# Patient Record
Sex: MALE | Race: BLACK OR AFRICAN AMERICAN | Employment: FULL TIME | URBAN - METROPOLITAN AREA
[De-identification: names, ages, dates, MRNs, and addresses within clinical notes are randomized per-mention and may not be internally consistent; named-entity substitution may affect disease eponyms.]

---

## 2018-08-03 ENCOUNTER — OFFICE VISIT (OUTPATIENT)
Dept: FAMILY MEDICINE CLINIC | Age: 48
End: 2018-08-03

## 2018-08-03 VITALS
WEIGHT: 215.2 LBS | DIASTOLIC BLOOD PRESSURE: 88 MMHG | TEMPERATURE: 98 F | HEIGHT: 74 IN | BODY MASS INDEX: 27.62 KG/M2 | HEART RATE: 82 BPM | RESPIRATION RATE: 20 BRPM | OXYGEN SATURATION: 98 % | SYSTOLIC BLOOD PRESSURE: 138 MMHG

## 2018-08-03 DIAGNOSIS — R22.9 SUBCUTANEOUS MASS: ICD-10-CM

## 2018-08-03 DIAGNOSIS — Z00.00 ANNUAL PHYSICAL EXAM: Primary | ICD-10-CM

## 2018-08-03 DIAGNOSIS — Z12.11 SCREENING FOR COLON CANCER: ICD-10-CM

## 2018-08-03 DIAGNOSIS — E78.2 MIXED HYPERLIPIDEMIA: ICD-10-CM

## 2018-08-03 DIAGNOSIS — R19.01 ABDOMINAL WALL MASS OF RIGHT UPPER QUADRANT: ICD-10-CM

## 2018-08-03 LAB
BILIRUB UR QL STRIP: NEGATIVE
GLUCOSE UR-MCNC: NEGATIVE MG/DL
KETONES P FAST UR STRIP-MCNC: NEGATIVE MG/DL
PH UR STRIP: 6 [PH] (ref 4.6–8)
PROT UR QL STRIP: NEGATIVE
SP GR UR STRIP: 1.02 (ref 1–1.03)
UA UROBILINOGEN AMB POC: NORMAL (ref 0.2–1)
URINALYSIS CLARITY POC: CLEAR
URINALYSIS COLOR POC: YELLOW
URINE BLOOD POC: NEGATIVE
URINE LEUKOCYTES POC: NEGATIVE
URINE NITRITES POC: NEGATIVE

## 2018-08-03 RX ORDER — DICLOFENAC SODIUM 75 MG/1
75 TABLET, DELAYED RELEASE ORAL
COMMUNITY
Start: 2017-07-07 | End: 2018-08-03

## 2018-08-03 NOTE — MR AVS SNAPSHOT
[facility] 
 
 
 [address] [facility] [id number] [phone] Patient: Greta Lyons MRN: [id number] :1970 Visit Information Date & Time Provider Department Dept. Phone Encounter #  
 8/3/2018 10:15 AM Brien Smith [phone] [id number] Follow-up Instructions Return in about 1 year (around 8/3/2019). Upcoming Health Maintenance Date Due Pneumococcal 19-64 Medium Risk (1 of 1 - PPSV23) 1987 FOBT Q 1 YEAR AGE 50-75 2018 Influenza Age 5 to Adult 2018 DTaP/Tdap/Td series (2 - Td) 2026 Allergies as of 8/3/2018  Review Complete On: 8/3/2018 By: eHide Mccollum No Known Allergies Current Immunizations  Never Reviewed No immunizations on file. Not reviewed this visit You Were Diagnosed With   
  
 Codes Comments Annual physical exam    -  Primary ICD-10-CM: Z00.00 ICD-9-CM: V70.0 Mixed hyperlipidemia     ICD-10-CM: E78.2 ICD-9-CM: 272.2 Subcutaneous mass     ICD-10-CM: R22.9 ICD-9-CM: 782.2 Screening for colon cancer     ICD-10-CM: Z12.11 ICD-9-CM: V76.51 Vitals BP Pulse Temp Resp Height(growth percentile) Weight(growth percentile) 138/88 (BP 1 Location: Left arm, BP Patient Position: Sitting) 82 98 °F (36.7 °C) (Oral) 20 6' 2\" (1.88 m) 215 lb 3.2 oz (97.6 kg) SpO2 BMI Smoking Status 98% 27.63 kg/m2 Current Every Day Smoker Vitals History BMI and BSA Data Body Mass Index Body Surface Area  
 27.63 kg/m 2 2.26 m 2 Preferred Pharmacy Pharmacy Name Phone [facility] [address] [address] [phone] Your Updated Medication List  
  
Notice  As of 8/3/2018 10:57 AM  
 You have not been prescribed any medications. We Performed the Following AMB POC URINALYSIS DIP STICK AUTO W/O MICRO [11040 CPT(R)] CBC WITH AUTOMATED DIFF [12049 CPT(R)] LIPID PANEL [61911 CPT(R)] METABOLIC PANEL, COMPREHENSIVE [95432 CPT(R)] PSA, DIAGNOSTIC (PROSTATE SPECIFIC AG) [id number] CPT(R)] REFERRAL TO GASTROENTEROLOGY [DZQ37 Custom] Follow-up Instructions Return in about 1 year (around 8/3/2019). Referral Information Referral ID Referred By Referred To  
  
 [id number] Jacklyn Jama MD   
   [facility] [employer] [address] [facility] [address], [address] Phone: [phone] Fax: [phone] Visits Status Start Date End Date 1 New Request 8/3/18 8/3/19 If your referral has a status of pending review or denied, additional information will be sent to support the outcome of this decision. Introducing [facility]! [facility] introduces WizIQ patient portal. Now you can access parts of your medical record, email your doctor's office, and request medication refills online. 1. In your internet browser, go to [email or web address] 2. Click on the First Time User? Click Here link in the Sign In box. You will see the New Member Sign Up page. 3. Enter your WizIQ Access Code exactly as it appears below. You will not need to use this code after youve completed the sign-up process. If you do not sign up before the expiration date, you must request a new code. · WizIQ Access Code: [email or web address] Expires: 11/1/2018 10:57 AM 
 
4. Enter the last four digits of your Social Security Number (xxxx) and Date of Birth (mm/dd/yyyy) as indicated and click Submit. You will be taken to the next sign-up page. 5. Create a NanoTunet ID. This will be your NanoTunet login ID and cannot be changed, so think of one that is secure and easy to remember. 6. Create a NanoTunet password. You can change your password at any time. 7. Enter your Password Reset Question and Answer. This can be used at a later time if you forget your password. 8. Enter your e-mail address. You will receive e-mail notification when new information is available in [address] 9. Click Sign Up. You can now view and download portions of your medical record. 10. Click the Download Summary menu link to download a portable copy of your medical information. If you have questions, please visit the Frequently Asked Questions section of the fos4X website. Remember, fos4X is NOT to be used for urgent needs. For medical emergencies, dial 911. Now available from your iPhone and Android! Please provide this summary of care documentation to your next provider. Your primary care clinician is listed as Tana Mims. If you have any questions after today's visit, please call [phone].

## 2018-08-03 NOTE — PROGRESS NOTES
Subjective:  
 
Robert Adam is a 46 y.o. male presenting for annual exam and complete physical. 
 
Patient Active Problem List  
Diagnosis Code  OA (osteoarthritis) M19.90 Patient Active Problem List  
 Diagnosis Date Noted  OA (osteoarthritis) 06/15/2010 No Known Allergies Past Medical History:  
Diagnosis Date  OA (osteoarthritis) 6/15/2010 Past Surgical History:  
Procedure Laterality Date  HX ORCHIECTOMY TORSION  
 KNEE SCOPE,AID ANT CRUCIATE REPAIR Family History Problem Relation Age of Onset  Elevated Lipids Mother  Heart Disease Father Social History Substance Use Topics  Smoking status: Current Every Day Smoker Packs/day: 0.50  Smokeless tobacco: Never Used  Alcohol use Yes Comment: socially Review of Systems Constitutional: negative Eyes: negative Ears, nose, mouth, throat, and face: negative Respiratory: negative Cardiovascular: negative Gastrointestinal: negative Genitourinary:negative Integument/breast: negative Musculoskeletal:negative Objective:  
 
Visit Vitals  /88 (BP 1 Location: Left arm, BP Patient Position: Sitting)  Pulse 82  Temp 98 °F (36.7 °C) (Oral)  Resp 20  
 Ht 6' 2\" (1.88 m)  Wt 215 lb 3.2 oz (97.6 kg)  SpO2 98%  BMI 27.63 kg/m2 Physical exam:  
General appearance - alert, well appearing, and in no distress Mental status - alert, oriented to person, place, and time Eyes - pupils equal and reactive, extraocular eye movements intact Ears - bilateral TM's and external ear canals normal 
Nose - normal and patent, no erythema, discharge or polyps Mouth - mucous membranes moist, pharynx normal without lesions Neck - supple, no significant adenopathy, carotids upstroke normal bilaterally, no bruits, thyroid exam: thyroid is normal in size without nodules or tenderness Chest - clear to auscultation, no wheezes, rales or rhonchi, symmetric air entry Heart - normal rate, regular rhythm, normal S1, S2, no murmurs, rubs, clicks or gallops Abdomen - soft, nontender, nondistended, no masses or organomegaly 2x2 cm soft RUQ  Subcutaneous mass Rectal - negative without mass, lesions or tenderness Musculoskeletal - no joint tenderness, deformity or swelling Extremities - peripheral pulses normal, no pedal edema, no clubbing or cyanosis Skin - normal coloration and turgor, no rashes, no suspicious skin lesions noted Diagnoses and all orders for this visit: 
 
1. Annual physical exam 
-     METABOLIC PANEL, COMPREHENSIVE 
-     CBC WITH AUTOMATED DIFF 
-     PROSTATE SPECIFIC AG 
-     AMB POC URINALYSIS DIP STICK AUTO W/O MICRO 2. Mixed hyperlipidemia -     LIPID PANEL 3. Subcutaneous mass 4. Screening for colon cancer 
-     Cesia Rags[facility] 5. Abdominal wall mass of right upper quadrant Follow-up Disposition: 
Return in about 1 year (around 8/3/2019). Eliseo Callahan

## 2018-08-04 LAB
ALBUMIN SERPL-MCNC: 4.4 G/DL (ref 3.5–5.5)
ALBUMIN/GLOB SERPL: 1.5 {RATIO} (ref 1.2–2.2)
ALP SERPL-CCNC: 62 IU/L (ref 39–117)
ALT SERPL-CCNC: 55 IU/L (ref 0–44)
AST SERPL-CCNC: 33 IU/L (ref 0–40)
BASOPHILS # BLD AUTO: 0.1 X10E3/UL (ref 0–0.2)
BASOPHILS NFR BLD AUTO: 1 %
BILIRUB SERPL-MCNC: 0.5 MG/DL (ref 0–1.2)
BUN SERPL-MCNC: 14 MG/DL (ref 6–24)
BUN/CREAT SERPL: 14 (ref 9–20)
CALCIUM SERPL-MCNC: 9.2 MG/DL (ref 8.7–10.2)
CHLORIDE SERPL-SCNC: 107 MMOL/L (ref 96–106)
CHOLEST SERPL-MCNC: 235 MG/DL (ref 100–199)
CO2 SERPL-SCNC: 19 MMOL/L (ref 20–29)
CREAT SERPL-MCNC: 0.97 MG/DL (ref 0.76–1.27)
EOSINOPHIL # BLD AUTO: 0.2 X10E3/UL (ref 0–0.4)
EOSINOPHIL NFR BLD AUTO: 4 %
ERYTHROCYTE [DISTWIDTH] IN BLOOD BY AUTOMATED COUNT: 14.1 % (ref 12.3–15.4)
GLOBULIN SER CALC-MCNC: 3 G/DL (ref 1.5–4.5)
GLUCOSE SERPL-MCNC: 95 MG/DL (ref 65–99)
HCT VFR BLD AUTO: 43 % (ref 37.5–51)
HDLC SERPL-MCNC: 42 MG/DL
HGB BLD-MCNC: 14.4 G/DL (ref 13–17.7)
IMM GRANULOCYTES # BLD: 0 X10E3/UL (ref 0–0.1)
IMM GRANULOCYTES NFR BLD: 1 %
INTERPRETATION, 910389: NORMAL
LDLC SERPL CALC-MCNC: 169 MG/DL (ref 0–99)
LYMPHOCYTES # BLD AUTO: 2.4 X10E3/UL (ref 0.7–3.1)
LYMPHOCYTES NFR BLD AUTO: 41 %
MCH RBC QN AUTO: 28.8 PG (ref 26.6–33)
MCHC RBC AUTO-ENTMCNC: 33.5 G/DL (ref 31.5–35.7)
MCV RBC AUTO: 86 FL (ref 79–97)
MONOCYTES # BLD AUTO: 0.6 X10E3/UL (ref 0.1–0.9)
MONOCYTES NFR BLD AUTO: 11 %
NEUTROPHILS # BLD AUTO: 2.3 X10E3/UL (ref 1.4–7)
NEUTROPHILS NFR BLD AUTO: 42 %
PLATELET # BLD AUTO: 269 X10E3/UL (ref 150–379)
POTASSIUM SERPL-SCNC: 4.5 MMOL/L (ref 3.5–5.2)
PROT SERPL-MCNC: 7.4 G/DL (ref 6–8.5)
PSA SERPL-MCNC: 0.5 NG/ML (ref 0–4)
RBC # BLD AUTO: 5 X10E6/UL (ref 4.14–5.8)
SODIUM SERPL-SCNC: 141 MMOL/L (ref 134–144)
TRIGL SERPL-MCNC: 121 MG/DL (ref 0–149)
VLDLC SERPL CALC-MCNC: 24 MG/DL (ref 5–40)
WBC # BLD AUTO: 5.5 X10E3/UL (ref 3.4–10.8)

## 2022-01-10 PROBLEM — Z00.00 ANNUAL PHYSICAL EXAM: Status: ACTIVE | Noted: 2022-01-10

## 2022-01-11 ENCOUNTER — OFFICE VISIT (OUTPATIENT)
Dept: FAMILY MEDICINE CLINIC | Age: 52
End: 2022-01-11
Payer: COMMERCIAL

## 2022-01-11 VITALS
OXYGEN SATURATION: 98 % | RESPIRATION RATE: 20 BRPM | HEART RATE: 88 BPM | HEIGHT: 74 IN | SYSTOLIC BLOOD PRESSURE: 122 MMHG | TEMPERATURE: 98.4 F | WEIGHT: 220.4 LBS | BODY MASS INDEX: 28.28 KG/M2 | DIASTOLIC BLOOD PRESSURE: 78 MMHG

## 2022-01-11 DIAGNOSIS — Z12.11 SCREEN FOR COLON CANCER: ICD-10-CM

## 2022-01-11 DIAGNOSIS — R35.1 NOCTURIA: ICD-10-CM

## 2022-01-11 DIAGNOSIS — Z11.59 ENCOUNTER FOR HEPATITIS C SCREENING TEST FOR LOW RISK PATIENT: ICD-10-CM

## 2022-01-11 DIAGNOSIS — E78.2 MIXED HYPERLIPIDEMIA: ICD-10-CM

## 2022-01-11 DIAGNOSIS — Z00.00 ANNUAL PHYSICAL EXAM: Primary | ICD-10-CM

## 2022-01-11 PROCEDURE — 99386 PREV VISIT NEW AGE 40-64: CPT | Performed by: FAMILY MEDICINE

## 2022-01-11 NOTE — PROGRESS NOTES
Subjective:     Pete Gan is a 46 y.o. male presenting for annual exam and complete physical.On no meds. Feeling well    Patient Active Problem List    Diagnosis Date Noted    Annual physical exam 01/10/2022    OA (osteoarthritis) 06/15/2010       No Known Allergies  Past Medical History:   Diagnosis Date    OA (osteoarthritis) 6/15/2010     Past Surgical History:   Procedure Laterality Date    HX ORCHIECTOMY      TORSION    AR KNEE SCOPE,AID ANT CRUCIATE REPAIR       Family History   Problem Relation Age of Onset    Elevated Lipids Mother     Heart Disease Father      Social History     Tobacco Use    Smoking status: Current Every Day Smoker     Packs/day: 0.50    Smokeless tobacco: Never Used   Substance Use Topics    Alcohol use: Yes     Comment: socially             Review of Systems  Constitutional: negative  Eyes: negative  Ears, nose, mouth, throat, and face: positive for nasal congestion  Respiratory: negative  Cardiovascular: negative  Gastrointestinal: negative  Genitourinary:negative  Integument/breast: negative  Hematologic/lymphatic: negative  Musculoskeletal:positive for bilateral knee pain/oa  Neurological: negative    Objective:     Visit Vitals  /78 (BP 1 Location: Right upper arm, BP Patient Position: Sitting, BP Cuff Size: Adult)   Pulse 88   Temp 98.4 °F (36.9 °C) (Oral)   Resp 20   Ht 6' 2\" (1.88 m)   Wt 220 lb 6.4 oz (100 kg)   SpO2 98%   BMI 28.30 kg/m²     Physical exam:   General appearance - alert, well appearing, and in no distress  Mental status - alert, oriented to person, place, and time  Eyes - pupils equal and reactive, extraocular eye movements intact  Ears - bilateral TM's and external ear canals normal  Nose - normal and patent, no erythema, discharge or polyps  Mouth - mucous membranes moist, pharynx normal without lesions  Neck - supple, no significant adenopathy, carotids upstroke normal bilaterally, no bruits, thyroid exam: thyroid is normal in size without nodules or tenderness  Chest - clear to auscultation, no wheezes, rales or rhonchi, symmetric air entry  Heart - normal rate, regular rhythm, normal S1, S2, no murmurs, rubs, clicks or gallops  Abdomen - soft, nontender, nondistended, no masses or organomegaly  Rectal - negative without mass, lesions or tenderness, stool guaiac negative, PROSTATE EXAM: smooth and symmetric without nodules or tenderness  Neurological - alert, oriented, normal speech, no focal findings or movement disorder noted  Musculoskeletal - no joint tenderness, deformity or swelling  Extremities - peripheral pulses normal, no pedal edema, no clubbing or cyanosis     Assessment/Plan:       continue present plan, routine labs ordered, call if any problems. Diagnoses and all orders for this visit:    1. Annual physical exam  -     METABOLIC PANEL, COMPREHENSIVE; Future  -     LIPID PANEL; Future  -     CBC WITH AUTOMATED DIFF; Future  -     PSA, DIAGNOSTIC (PROSTATE SPECIFIC AG); Future  -     URINALYSIS W/ RFLX MICROSCOPIC; Future    2. Nocturia    3. Screen for colon cancer    4. Mixed hyperlipidemia    5. Encounter for hepatitis C screening test for low risk patient  -     HEPATITIS C AB; Future        Doing well,continue current meds and treatments    Follow-up and Dispositions    · Return in about 1 year (around 1/11/2023).      Tigist Reyez

## 2022-01-13 LAB
ALBUMIN SERPL-MCNC: 4 G/DL (ref 3.5–5)
ALBUMIN/GLOB SERPL: 1 {RATIO} (ref 1.1–2.2)
ALP SERPL-CCNC: 70 U/L (ref 45–117)
ALT SERPL-CCNC: 79 U/L (ref 12–78)
ANION GAP SERPL CALC-SCNC: 7 MMOL/L (ref 5–15)
APPEARANCE UR: CLEAR
AST SERPL-CCNC: 32 U/L (ref 15–37)
BASOPHILS # BLD: 0.1 K/UL (ref 0–0.1)
BASOPHILS NFR BLD: 1 % (ref 0–1)
BILIRUB SERPL-MCNC: 0.3 MG/DL (ref 0.2–1)
BILIRUB UR QL: NEGATIVE
BUN SERPL-MCNC: 24 MG/DL (ref 6–20)
BUN/CREAT SERPL: 21 (ref 12–20)
CALCIUM SERPL-MCNC: 9.7 MG/DL (ref 8.5–10.1)
CHLORIDE SERPL-SCNC: 107 MMOL/L (ref 97–108)
CHOLEST SERPL-MCNC: 270 MG/DL
CO2 SERPL-SCNC: 23 MMOL/L (ref 21–32)
COLOR UR: NORMAL
CREAT SERPL-MCNC: 1.13 MG/DL (ref 0.7–1.3)
DIFFERENTIAL METHOD BLD: NORMAL
EOSINOPHIL # BLD: 0.4 K/UL (ref 0–0.4)
EOSINOPHIL NFR BLD: 5 % (ref 0–7)
ERYTHROCYTE [DISTWIDTH] IN BLOOD BY AUTOMATED COUNT: 13.4 % (ref 11.5–14.5)
GLOBULIN SER CALC-MCNC: 4 G/DL (ref 2–4)
GLUCOSE SERPL-MCNC: 107 MG/DL (ref 65–100)
GLUCOSE UR STRIP.AUTO-MCNC: NEGATIVE MG/DL
HCT VFR BLD AUTO: 47.4 % (ref 36.6–50.3)
HCV AB SERPL QL IA: NONREACTIVE
HDLC SERPL-MCNC: 39 MG/DL
HDLC SERPL: 6.9 {RATIO} (ref 0–5)
HGB BLD-MCNC: 15.9 G/DL (ref 12.1–17)
HGB UR QL STRIP: NEGATIVE
IMM GRANULOCYTES # BLD AUTO: 0 K/UL (ref 0–0.04)
IMM GRANULOCYTES NFR BLD AUTO: 0 % (ref 0–0.5)
KETONES UR QL STRIP.AUTO: NEGATIVE MG/DL
LDLC SERPL CALC-MCNC: 185.6 MG/DL (ref 0–100)
LEUKOCYTE ESTERASE UR QL STRIP.AUTO: NEGATIVE
LYMPHOCYTES # BLD: 2.8 K/UL (ref 0.8–3.5)
LYMPHOCYTES NFR BLD: 40 % (ref 12–49)
MCH RBC QN AUTO: 29.6 PG (ref 26–34)
MCHC RBC AUTO-ENTMCNC: 33.5 G/DL (ref 30–36.5)
MCV RBC AUTO: 88.1 FL (ref 80–99)
MONOCYTES # BLD: 0.9 K/UL (ref 0–1)
MONOCYTES NFR BLD: 13 % (ref 5–13)
NEUTS SEG # BLD: 2.8 K/UL (ref 1.8–8)
NEUTS SEG NFR BLD: 41 % (ref 32–75)
NITRITE UR QL STRIP.AUTO: NEGATIVE
NRBC # BLD: 0 K/UL (ref 0–0.01)
NRBC BLD-RTO: 0 PER 100 WBC
PH UR STRIP: 5.5 [PH] (ref 5–8)
PLATELET # BLD AUTO: 301 K/UL (ref 150–400)
PLATELET COMMENTS,PCOM: NORMAL
POTASSIUM SERPL-SCNC: 4.5 MMOL/L (ref 3.5–5.1)
PROT SERPL-MCNC: 8 G/DL (ref 6.4–8.2)
PROT UR STRIP-MCNC: NEGATIVE MG/DL
PSA SERPL-MCNC: 0.6 NG/ML (ref 0.01–4)
RBC # BLD AUTO: 5.38 M/UL (ref 4.1–5.7)
RBC MORPH BLD: NORMAL
SODIUM SERPL-SCNC: 137 MMOL/L (ref 136–145)
SP GR UR REFRACTOMETRY: 1.02 (ref 1–1.03)
TRIGL SERPL-MCNC: 227 MG/DL (ref ?–150)
UROBILINOGEN UR QL STRIP.AUTO: 0.2 EU/DL (ref 0.2–1)
VLDLC SERPL CALC-MCNC: 45.4 MG/DL
WBC # BLD AUTO: 7 K/UL (ref 4.1–11.1)

## 2022-02-09 PROBLEM — Z00.00 ANNUAL PHYSICAL EXAM: Status: RESOLVED | Noted: 2022-01-10 | Resolved: 2022-02-09

## 2022-03-15 NOTE — PROGRESS NOTES
HPI: Medina Springer (: 1970) is a 46 y.o. male, patient, here for evaluation of the following chief complaint(s):    Wrist Pain (Fall 22 to left wrist. Xrays at [facility]. Left hand dominant.)  The patient is seen today to evaluate his left wrist.  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 He actually fell got up and then fell a second time. He thought he sprained his wrist and waited a couple days but his pain did not improve. [facility] X-rays confirmed a minimally displaced ulnar styloid base fracture. He was immobilized into a splint. There has been no additional report of any other falls or injuries. He has denied numbness or tingling in his hand and is seen for further treatment. Vitals:  Ht 6' 1\" (1.854 m)   Wt 219 lb (99.3 kg)   BMI 28.89 kg/m²    Body mass index is 28.89 kg/m². No Known Allergies    Current Outpatient Medications   Medication Sig    ibuprofen (AdviL) 200 mg tablet Take  by mouth. No current facility-administered medications for this visit. Past Medical History:   Diagnosis Date    OA (osteoarthritis) 6/15/2010        Past Surgical History:   Procedure Laterality Date    HX ORCHIECTOMY      TORSION    DE KNEE SCOPE,AID ANT CRUCIATE REPAIR         Family History   Problem Relation Age of Onset    Elevated Lipids Mother     Heart Disease Father         Social History     Tobacco Use    Smoking status: Current Every Day Smoker     Packs/day: 0.50    Smokeless tobacco: Never Used   Substance Use Topics    Alcohol use: Yes     Comment: socially    Drug use: No        Review of Systems   All other systems reviewed and are negative. ROS     Positive for: Musculoskeletal    Last edited by Gene Vega on 3/16/2022  8:05 AM. (History)             Physical Exam    The patient's left wrist is tender to the ulnar aspect.   He really did not have significant tenderness radially. He has guarded overall motion. Good digital range of motion sensation refill. No complaints with right elbow, forearm, wrist and hand. Imaging:    XR Results (most recent):  Results from Appointment encounter on 03/16/22    XR WRIST LT AP/LAT/OBL MIN 3V    Narrative  AP, lateral and oblique x-ray of the left wrist shows a displaced ulnar styloid base fracture. No additional displacement of any other fracture noted. Outside x-rays from February 26 had also been reviewed and showed less displacement and angulation of the ulnar styloid base fracture involving the left wrist no distal radius fracture or other fracture pattern was definitively seen. ASSESSMENT/PLAN:  Below is the assessment and plan developed based on review of pertinent history, physical exam, labs, studies, and medications. Patient fell while working injuring his dominant left wrist on 2/24/2022 and sustained an ulnar styloid fracture. The fracture fragment is a little more displaced than initially. This is his dominant left wrist and hand. He is concerned regarding the future of the healing. We spoke about conservative as well as operative options. He is 3 weeks out at presentation with a displaced isolated ulnar styloid fracture to his dominant left wrist and would like to have this surgically repaired. I drew pictures of the cannulated screw fixation of the ulnar styloid fracture and answered questions. I reviewed risks that include but not limited to stiffness, pain, nerve or tendon damage, incomplete healing and possible future surgery. Arrangements can be made for this to be performed on an outpatient basis soon as possible. 1. Left wrist pain  2. Traumatic closed fracture of ulnar styloid with minimal displacement, left, initial encounter      Return in about 4 weeks (around 4/13/2022). An electronic signature was used to authenticate this note.   -- Caprice Leyden, MD

## 2022-03-16 ENCOUNTER — OFFICE VISIT (OUTPATIENT)
Dept: ORTHOPEDIC SURGERY | Age: 52
End: 2022-03-16
Payer: OTHER MISCELLANEOUS

## 2022-03-16 VITALS — WEIGHT: 219 LBS | HEIGHT: 73 IN | BODY MASS INDEX: 29.03 KG/M2

## 2022-03-16 DIAGNOSIS — S52.612A TRAUMATIC CLOSED FRACTURE OF ULNAR STYLOID WITH MINIMAL DISPLACEMENT, LEFT, INITIAL ENCOUNTER: Primary | ICD-10-CM

## 2022-03-16 DIAGNOSIS — M25.532 LEFT WRIST PAIN: ICD-10-CM

## 2022-03-16 PROCEDURE — 99203 OFFICE O/P NEW LOW 30 MIN: CPT | Performed by: ORTHOPAEDIC SURGERY

## 2022-03-16 RX ORDER — IBUPROFEN 200 MG
TABLET ORAL
COMMUNITY

## 2022-03-16 NOTE — LETTER
3/16/2022    Patient: Vernon Irvin   YOB: 1970   Date of Visit: 3/16/2022     Ragini Varghese, [address] [address]  [facility] [address]    Dear Ragini Varghese MD,      Thank you for referring Mr. Yumiko Castro to [facility] for evaluation. My notes for this consultation are attached. If you have questions, please do not hesitate to call me. I look forward to following your patient along with you.       Sincerely,    Doreen English MD

## 2022-03-21 DIAGNOSIS — S52.612A TRAUMATIC CLOSED FRACTURE OF ULNAR STYLOID WITH MINIMAL DISPLACEMENT, LEFT, INITIAL ENCOUNTER: Primary | ICD-10-CM

## 2022-03-21 RX ORDER — OXYCODONE AND ACETAMINOPHEN 5; 325 MG/1; MG/1
1 TABLET ORAL
Qty: 15 TABLET | Refills: 0 | Status: SHIPPED | OUTPATIENT
Start: 2022-03-21 | End: 2022-04-04

## 2022-03-25 NOTE — PROGRESS NOTES
HPI: Yassine Perez (: 1970) is a 46 y.o. male, patient, here for evaluation of the following chief complaint(s):    No chief complaint on file. The patient is seen today to evaluate his left wrist.  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 He actually fell got up and then fell a second time. He thought he sprained his wrist and waited a couple days but his pain did not improve. [facility]. X-rays confirmed a minimally displaced ulnar styloid base fracture. He was immobilized into a splint. There has been no additional report of any other falls or injuries. He has denied numbness or tingling in his hand. He underwent ORIF of the left ulnar styloid fracture on 3/21/2022. Vitals: There were no vitals taken for this visit. There is no height or weight on file to calculate BMI. No Known Allergies    Current Outpatient Medications   Medication Sig    oxyCODONE-acetaminophen (Percocet) 5-325 mg per tablet Take 1 Tablet by mouth every four (4) hours as needed for Pain for up to 14 days. Max Daily Amount: 6 Tablets.  ibuprofen (AdviL) 200 mg tablet Take  by mouth. No current facility-administered medications for this visit. Past Medical History:   Diagnosis Date    OA (osteoarthritis) 6/15/2010        Past Surgical History:   Procedure Laterality Date    HX ORCHIECTOMY      TORSION    MT KNEE SCOPE,AID ANT CRUCIATE REPAIR         Family History   Problem Relation Age of Onset    Elevated Lipids Mother     Heart Disease Father         Social History     Tobacco Use    Smoking status: Current Every Day Smoker     Packs/day: 0.50    Smokeless tobacco: Never Used   Substance Use Topics    Alcohol use: Yes     Comment: socially    Drug use: No        Review of Systems   All other systems reviewed and are negative.              Physical Exam    The patient's left wrist wound is healing well with absorbing sutures in place. No redness drainage or sign of infection. Good early motion. Imaging:    XR Results (most recent):  Results from Appointment encounter on 03/16/22    XR WRIST LT AP/LAT/OBL MIN 3V    Narrative  AP, lateral and oblique x-ray of the left wrist shows a displaced ulnar styloid base fracture. No additional displacement of any other fracture noted. Outside x-rays from February 26 had also been reviewed and showed less displacement and angulation of the ulnar styloid base fracture involving the left wrist no distal radius fracture or other fracture pattern was definitively seen. XR Results (most recent):  Results from Appointment encounter on 03/29/22    XR WRIST LT AP/LAT/OBL MIN 3V    Narrative  AP, lateral and oblique x-ray of the left wrist shows the single screw fixation of the ulnar styloid fracture thus far in satisfactory position and alignment. ASSESSMENT/PLAN:  Below is the assessment and plan developed based on review of pertinent history, physical exam, labs, studies, and medications. Patient fell while working injuring his dominant left wrist on 2/24/2022 and sustained an ulnar styloid fracture. The fracture fragment is a little more displaced than initially. This is his dominant left wrist and hand. He is concerned regarding the future of the healing. We spoke about conservative as well as operative options. He is 3 weeks out at presentation with a displaced isolated ulnar styloid fracture to his dominant left wrist and would like to have this surgically repaired. He underwent ORIF of the ulnar styloid fracture with screw fixation on 3/21/2022. I recommended a splint for comfort and support and gradual motion and eventual strength recovery. He was offered but deferred a cast.  Follow-up in 3 to 4 weeks. He may work light duty lifting up to 5 pounds with the wrist in the splint.   Currently anticipate it may be 3 months before he is capable full duty work pending his overall healing. 1. Traumatic closed fracture of ulnar styloid with minimal displacement, left, with routine healing, subsequent encounter  2. Left wrist pain      No follow-ups on file. An electronic signature was used to authenticate this note.   -- Angelique Srivastava MD

## 2022-03-29 ENCOUNTER — OFFICE VISIT (OUTPATIENT)
Dept: ORTHOPEDIC SURGERY | Age: 52
End: 2022-03-29
Payer: OTHER MISCELLANEOUS

## 2022-03-29 DIAGNOSIS — S52.612D TRAUMATIC CLOSED FRACTURE OF ULNAR STYLOID WITH MINIMAL DISPLACEMENT, LEFT, WITH ROUTINE HEALING, SUBSEQUENT ENCOUNTER: Primary | ICD-10-CM

## 2022-03-29 DIAGNOSIS — M25.532 LEFT WRIST PAIN: ICD-10-CM

## 2022-03-29 PROCEDURE — 99024 POSTOP FOLLOW-UP VISIT: CPT | Performed by: ORTHOPAEDIC SURGERY

## 2022-03-29 NOTE — LETTER
3/29/2022     Mr. 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           Sincerely,      Lisa Jackson MD

## 2022-03-29 NOTE — LETTER
3/29/2022    Patient: Adalberto Connelly   YOB: 1970   Date of Visit: 3/29/2022     Shannon Aguirre, [address] [address]  [address] [facility] [address]    Dear Shannon Aguirre MD,      Thank you for referring Mr. Jannette Zheng to [facility] for evaluation. My notes for this consultation are attached. If you have questions, please do not hesitate to call me. I look forward to following your patient along with you.       Sincerely,    Mireya Law MD

## 2022-04-21 NOTE — PROGRESS NOTES
HPI: Bertin Bravo (: 1970) is a 45 y.o. male, patient, here for evaluation of the following chief complaint(s):    Wrist Pain (Left wrist still a bit tender )  The patient is seen today to evaluate his left wrist.  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 He actually fell got up and then fell a second time. He thought he sprained his wrist and waited a couple days but his pain did not improve. He was evaluated patient first on 2022. X-rays confirmed a minimally displaced ulnar styloid base fracture. He was immobilized into a splint. There has been no additional report of any other falls or injuries. He has denied numbness or tingling in his hand. He underwent ORIF of the left ulnar styloid fracture on 3/21/2022. Vitals: There were no vitals taken for this visit. There is no height or weight on file to calculate BMI. No Known Allergies    Current Outpatient Medications   Medication Sig    ibuprofen (AdviL) 200 mg tablet Take  by mouth. No current facility-administered medications for this visit. Past Medical History:   Diagnosis Date    OA (osteoarthritis) 6/15/2010        Past Surgical History:   Procedure Laterality Date    HX ORCHIECTOMY      TORSION    HX ORTHOPAEDIC Left 2022    ORIF left ulnar styloisd     [place] KNEE SCOPE,AID ANT CRUCIATE REPAIR         Family History   Problem Relation Age of Onset    Elevated Lipids Mother     Heart Disease Father         Social History     Tobacco Use    Smoking status: Current Every Day Smoker     Packs/day: 0.50    Smokeless tobacco: Never Used   Substance Use Topics    Alcohol use: Yes     Comment: socially    Drug use: No        Review of Systems   All other systems reviewed and are negative. Physical Exam    The patient's left wrist wound is healed. No redness drainage or sign of infection. Good early motion.   He is now working on motion restoration. Scar is nicely healed. Imaging:    XR Results (most recent):  Results from Appointment encounter on 04/26/22    XR WRIST LT AP/LAT/OBL MIN 3V    Narrative  AP, lateral and oblique x-ray of the left wrist shows continued interval healing of the ulnar styloid base fracture with no change in position of the screw fixation. Outside x-rays from February 26 had also been reviewed and showed less displacement and angulation of the ulnar styloid base fracture involving the left wrist no distal radius fracture or other fracture pattern was definitively seen. XR Results (most recent):  Results from Appointment encounter on 04/26/22    XR WRIST LT AP/LAT/OBL MIN 3V    Narrative  AP, lateral and oblique x-ray of the left wrist shows continued interval healing of the ulnar styloid base fracture with no change in position of the screw fixation. ASSESSMENT/PLAN:  Below is the assessment and plan developed based on review of pertinent history, physical exam, labs, studies, and medications. Patient fell while working injuring his dominant left wrist on 2/24/2022 and sustained an ulnar styloid fracture. The fracture fragment is a little more displaced than initially. This is his dominant left wrist and hand. He is concerned regarding the future of the healing. We spoke about conservative as well as operative options. He is 3 weeks out at presentation with a displaced isolated ulnar styloid fracture to his dominant left wrist and would like to have this surgically repaired. He underwent ORIF of the ulnar styloid fracture with screw fixation on 3/21/2022. I recommended a splint for comfort and support and gradual motion and eventual strength recovery. He was offered but deferred a cast.  He had worn the splint like a cast and had even been working light duty lifting up to 5 pounds with the wrist in the splint.   He can now increase his lifting capacity to his tolerance slowly in the 5 to 10 pound range as he is doing well. He understands that the fragment appears to be healing but eventually if it were to develop a nonunion fragment could be excised along with removal of the screw but this will be followed for 6 to 12 months closely. Follow-up again in 4 weeks and currently anticipate if all goes well he may be ready for full duty return to work 3 months post surgery. 1. Traumatic closed fracture of ulnar styloid with minimal displacement, left, with routine healing, subsequent encounter  2. Status post open reduction and internal fixation (ORIF) of fracture  -     XR WRIST LT AP/LAT/OBL MIN 3V; Future      Return in about 4 weeks (around 5/24/2022). An electronic signature was used to authenticate this note.   -- Woodrow Rivera MD

## 2022-04-26 ENCOUNTER — OFFICE VISIT (OUTPATIENT)
Dept: ORTHOPEDIC SURGERY | Age: 52
End: 2022-04-26
Payer: OTHER MISCELLANEOUS

## 2022-04-26 DIAGNOSIS — Z87.81 STATUS POST OPEN REDUCTION AND INTERNAL FIXATION (ORIF) OF FRACTURE: ICD-10-CM

## 2022-04-26 DIAGNOSIS — Z98.890 STATUS POST OPEN REDUCTION AND INTERNAL FIXATION (ORIF) OF FRACTURE: ICD-10-CM

## 2022-04-26 DIAGNOSIS — S52.612D TRAUMATIC CLOSED FRACTURE OF ULNAR STYLOID WITH MINIMAL DISPLACEMENT, LEFT, WITH ROUTINE HEALING, SUBSEQUENT ENCOUNTER: Primary | ICD-10-CM

## 2022-04-26 PROCEDURE — 99024 POSTOP FOLLOW-UP VISIT: CPT | Performed by: ORTHOPAEDIC SURGERY

## 2022-04-26 NOTE — LETTER
4/26/2022    Mr. Faina Mora  [phone] [place] [facility] [address]        Patient may continue to work light duty only lifting up to 5 - 10 pounds.        Sincerely,      Chante Perez MD

## 2022-04-26 NOTE — LETTER
4/26/2022    Patient: Minal Nam   YOB: 1970   Date of Visit: 4/26/2022     Bibi Shields, [address] [address]  [place]    Dear Bibi Shields MD,      Thank you for referring Mr. Diana Vanegas to [facility] for evaluation. My notes for this consultation are attached. If you have questions, please do not hesitate to call me. I look forward to following your patient along with you.       Sincerely,    Abdiel Dallas MD

## 2022-05-25 NOTE — PROGRESS NOTES
HPI: Corey Anne (: 1970) is a 45 y.o. male, patient, here for evaluation of the following chief complaint(s):    Surgical Follow-up (ORIF left ulnar styloid fracture on 3/21/22. )  The patient is seen today to evaluate his left wrist.  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 He actually fell got up and then fell a second time. He thought he sprained his wrist and waited a couple days but his pain did not improve. He was evaluated patient first on 2022. X-rays confirmed a minimally displaced ulnar styloid base fracture. He was immobilized into a splint. There has been no additional report of any other falls or injuries. He has denied numbness or tingling in his hand. He underwent ORIF of the left ulnar styloid fracture on 3/21/2022. Vitals: There were no vitals taken for this visit. There is no height or weight on file to calculate BMI. No Known Allergies    Current Outpatient Medications   Medication Sig    ibuprofen (AdviL) 200 mg tablet Take  by mouth. No current facility-administered medications for this visit. Past Medical History:   Diagnosis Date    OA (osteoarthritis) 6/15/2010        Past Surgical History:   Procedure Laterality Date    HX ORCHIECTOMY      TORSION    HX ORTHOPAEDIC Left 2022    ORIF left ulnar styloisd     [place] KNEE SCOPE,AID ANT CRUCIATE REPAIR         Family History   Problem Relation Age of Onset    Elevated Lipids Mother     Heart Disease Father         Social History     Tobacco Use    Smoking status: Current Every Day Smoker     Packs/day: 0.50    Smokeless tobacco: Never Used   Substance Use Topics    Alcohol use: Yes     Comment: socially    Drug use: No        Review of Systems   All other systems reviewed and are negative.       ROS     Positive for: Musculoskeletal    Last edited by Brock Santoro on 2022  9:59 AM. (History) Physical Exam    The patient's left wrist wound is healed. No redness drainage or sign of infection. Scar is nicely healed. He demonstrates full supination and pronation as well as 55 degrees of wrist flexion 65 degrees wrist extension. Imaging: Outside x-rays from February 26 had also been reviewed and showed less displacement and angulation of the ulnar styloid base fracture involving the left wrist no distal radius fracture or other fracture pattern was definitively seen. XR Results (most recent):  Results from Appointment encounter on 05/27/22    XR WRIST LT AP/LAT/OBL MIN 3V    Narrative  AP, lateral and oblique x-ray of the left wrist shows the apparent healing of the ulnar styloid fracture in satisfactory position alignment with the screw little bit more visible on its ulnar side. ASSESSMENT/PLAN:  Below is the assessment and plan developed based on review of pertinent history, physical exam, labs, studies, and medications. Patient fell while working injuring his dominant left wrist on 2/24/2022 and sustained an ulnar styloid fracture. The fracture fragment is a little more displaced than initially. This is his dominant left wrist and hand. He is concerned regarding the future of the healing. We spoke about conservative as well as operative options. He is 3 weeks out at presentation with a displaced isolated ulnar styloid fracture to his dominant left wrist and would like to have this surgically repaired. He underwent ORIF of the ulnar styloid fracture with screw fixation on 3/21/2022. He utilize a splint afterwards deferring a cast.  He still wears the splint for comfort especially at work but needs to progressively wean from the splint although he has restored good range of motion of the forearm and wrist.  He may increase his light duty working restrictions in the 10 to 20 pound range and together we are not aiming for return to full duty work on 7/1/2022.   Follow-up in 4 weeks. We again spoke about the potential future option to consider removal of the screw which seems somewhat exposed ulnarly but is not irritating him clinically. The fracture also appears to be healing well radiographically. He is pleased with his recovery thus far and is in agreement that if all continues to go well as he already is doing \"80% of everything\" at work that he will be able to return to work on around 7/1/2022.      1. Traumatic closed fracture of ulnar styloid with minimal displacement, left, with routine healing, subsequent encounter  -     XR WRIST LT AP/LAT/OBL MIN 3V; Future  2. Status post open reduction and internal fixation (ORIF) of fracture  3. Left wrist pain      No follow-ups on file. An electronic signature was used to authenticate this note.   -- Jonathan Carreon MD

## 2022-05-27 ENCOUNTER — OFFICE VISIT (OUTPATIENT)
Dept: ORTHOPEDIC SURGERY | Age: 52
End: 2022-05-27
Payer: OTHER MISCELLANEOUS

## 2022-05-27 DIAGNOSIS — S52.612D TRAUMATIC CLOSED FRACTURE OF ULNAR STYLOID WITH MINIMAL DISPLACEMENT, LEFT, WITH ROUTINE HEALING, SUBSEQUENT ENCOUNTER: Primary | ICD-10-CM

## 2022-05-27 DIAGNOSIS — M25.532 LEFT WRIST PAIN: ICD-10-CM

## 2022-05-27 DIAGNOSIS — Z98.890 STATUS POST OPEN REDUCTION AND INTERNAL FIXATION (ORIF) OF FRACTURE: ICD-10-CM

## 2022-05-27 DIAGNOSIS — Z87.81 STATUS POST OPEN REDUCTION AND INTERNAL FIXATION (ORIF) OF FRACTURE: ICD-10-CM

## 2022-05-27 PROCEDURE — 99024 POSTOP FOLLOW-UP VISIT: CPT | Performed by: ORTHOPAEDIC SURGERY

## 2022-05-27 NOTE — LETTER
5/27/2022     Mr. Octavia Wright  [phone] [place] [facility] [address]      Patient may work light duty lifting 10 to 20 pounds with his left hand.         Sincerely,      Mary Zaragoza MD

## 2022-07-05 NOTE — PROGRESS NOTES
HPI: Yaneth Dawn (: 1970) is a 45 y.o. male, patient, here for evaluation of the following chief complaint(s):    No chief complaint on file. The patient is seen today to evaluate his left wrist.  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 He actually fell got up and then fell a second time. He thought he sprained his wrist and waited a couple days but his pain did not improve. He was evaluated patient first on 2022. X-rays confirmed a minimally displaced ulnar styloid base fracture. He was immobilized into a splint. There has been no additional report of any other falls or injuries. He has denied numbness or tingling in his hand. He underwent ORIF of the left ulnar styloid fracture on 3/21/2022. Vitals: There were no vitals taken for this visit. There is no height or weight on file to calculate BMI. No Known Allergies    Current Outpatient Medications   Medication Sig    ibuprofen (AdviL) 200 mg tablet Take  by mouth. No current facility-administered medications for this visit. Past Medical History:   Diagnosis Date    OA (osteoarthritis) 6/15/2010        Past Surgical History:   Procedure Laterality Date    HX ORCHIECTOMY      TORSION    HX ORTHOPAEDIC Left 2022    ORIF left ulnar styloisd     [place] KNEE SCOPE,AID ANT CRUCIATE REPAIR         Family History   Problem Relation Age of Onset    Elevated Lipids Mother     Heart Disease Father         Social History     Tobacco Use    Smoking status: Current Every Day Smoker     Packs/day: 0.50    Smokeless tobacco: Never Used   Substance Use Topics    Alcohol use: Yes     Comment: socially    Drug use: No        Review of Systems   All other systems reviewed and are negative. Physical Exam    The patient's left wrist wound is healed. No redness drainage or sign of infection. Scar is nicely healed.   He demonstrates full supination and pronation as well as 55 degrees of wrist flexion 65 degrees wrist extension. Imaging: Outside x-rays from February 26 had also been reviewed and showed less displacement and angulation of the ulnar styloid base fracture involving the left wrist no distal radius fracture or other fracture pattern was definitively seen. XR Results (most recent):  Results from Appointment encounter on 07/06/22    XR WRIST LT AP/LAT/OBL MIN 3V    Narrative  AP, lateral and oblique x-ray of the left wrist shows the healing of the ulnar styloid fracture with the screw unchanged in position. ASSESSMENT/PLAN:  Below is the assessment and plan developed based on review of pertinent history, physical exam, labs, studies, and medications. Patient fell while working injuring his dominant left wrist on 2/24/2022 and sustained an ulnar styloid fracture. The fracture fragment is a little more displaced than initially. This is his dominant left wrist and hand. He is concerned regarding the future of the healing. We spoke about conservative as well as operative options. He is 3 weeks out at presentation with a displaced isolated ulnar styloid fracture to his dominant left wrist and would like to have this surgically repaired. He underwent ORIF of the ulnar styloid fracture with screw fixation on 3/21/2022. He utilize a splint afterwards deferring a cast.  He still wears the splint for comfort especially at work but needs to progressively wean from the splint although he has restored good range of motion of the forearm and wrist.  He may increase his light duty working restrictions in the 10 to 20 pound range and together we are not aiming for return to full duty work on 7/1/2022. Follow-up in 4 weeks. We again spoke about the potential future option to consider removal of the screw which seems somewhat exposed ulnarly but is not irritating him clinically.   The fracture also appears to be healing well radiographically. He is pleased with his recovery thus far and is in agreement that he can now return to regular full duty work without restriction. He may return at any time for further treatment. 1. Traumatic closed fracture of ulnar styloid with minimal displacement, left, with routine healing, subsequent encounter  -     XR WRIST LT AP/LAT/OBL MIN 3V; Future  2. Left wrist pain      Return if symptoms worsen or fail to improve. An electronic signature was used to authenticate this note.   -- Analilia Johns MD

## 2022-07-06 ENCOUNTER — OFFICE VISIT (OUTPATIENT)
Dept: ORTHOPEDIC SURGERY | Age: 52
End: 2022-07-06
Payer: OTHER MISCELLANEOUS

## 2022-07-06 DIAGNOSIS — M25.532 LEFT WRIST PAIN: ICD-10-CM

## 2022-07-06 DIAGNOSIS — S52.612D TRAUMATIC CLOSED FRACTURE OF ULNAR STYLOID WITH MINIMAL DISPLACEMENT, LEFT, WITH ROUTINE HEALING, SUBSEQUENT ENCOUNTER: Primary | ICD-10-CM

## 2022-07-06 PROCEDURE — 99213 OFFICE O/P EST LOW 20 MIN: CPT | Performed by: ORTHOPAEDIC SURGERY

## 2022-07-06 NOTE — LETTER
7/6/2022    Patient: Yaneth Dawn   YOB: 1970   Date of Visit: 7/6/2022     Sally Chamberlain, [address] [address]  [place] [place] [facility] [address]    Dear Sally Chamberlain MD,      Thank you for referring Mr. Carlos Sheppard to [facility] for evaluation. My notes for this consultation are attached. If you have questions, please do not hesitate to call me. I look forward to following your patient along with you.       Sincerely,    Srinivas Sharma MD

## 2022-07-06 NOTE — PATIENT INSTRUCTIONS
Wrist: Exercises  Introduction  Here are some examples of exercises for you to try. The exercises may be suggested for a condition or for rehabilitation. Start each exercise slowly. Ease off the exercises if you start to have pain. You will be told when to start these exercises and which ones will work best for you. How to do the exercises  Prayer stretch    1. Start with your palms together in front of your chest just below your chin. 2. Slowly lower your hands toward your waistline, keeping your hands close to your stomach and your palms together until you feel a mild to moderate stretch under your forearms. 3. Hold for at least 15 to 30 seconds. Repeat 2 to 4 times. Wrist flexor stretch    1. Extend your arm in front of you with your palm up. 2. Bend your wrist, pointing your hand toward the floor. 3. With your other hand, gently bend your wrist farther until you feel a mild to moderate stretch in your forearm. 4. Hold for at least 15 to 30 seconds. Repeat 2 to 4 times. Wrist extensor stretch    1. Repeat steps 1 through 4 of the stretch above, but begin with your extended hand palm down. Follow-up care is a key part of your treatment and safety. Be sure to make and go to all appointments, and call your doctor if you are having problems. It's also a good idea to know your test results and keep a list of the medicines you take. Where can you learn more? Go to http://www.gray.com/  Enter Z598 in the search box to learn more about \"Wrist: Exercises. \"  Current as of: July 1, 2021               Content Version: 13.2  © 2006-2022 [employer]. Care instructions adapted under license by [employer] (which disclaims liability or warranty for this information).  If you have questions about a medical condition or this instruction, always ask your healthcare professional. [address] [address] any warranty or liability for your use of this information.